# Patient Record
Sex: FEMALE | Race: WHITE | NOT HISPANIC OR LATINO | Employment: OTHER | URBAN - METROPOLITAN AREA
[De-identification: names, ages, dates, MRNs, and addresses within clinical notes are randomized per-mention and may not be internally consistent; named-entity substitution may affect disease eponyms.]

---

## 2021-06-05 ENCOUNTER — APPOINTMENT (OUTPATIENT)
Dept: GENERAL RADIOLOGY | Facility: HOSPITAL | Age: 66
End: 2021-06-05

## 2021-06-05 ENCOUNTER — HOSPITAL ENCOUNTER (EMERGENCY)
Facility: HOSPITAL | Age: 66
Discharge: HOME | End: 2021-06-05
Attending: EMERGENCY MEDICINE | Admitting: EMERGENCY MEDICINE

## 2021-06-05 VITALS
WEIGHT: 293 LBS | HEIGHT: 63 IN | OXYGEN SATURATION: 100 % | RESPIRATION RATE: 18 BRPM | DIASTOLIC BLOOD PRESSURE: 76 MMHG | HEART RATE: 90 BPM | BODY MASS INDEX: 51.91 KG/M2 | SYSTOLIC BLOOD PRESSURE: 173 MMHG | TEMPERATURE: 99 F

## 2021-06-05 DIAGNOSIS — I50.9 ACUTE ON CHRONIC CONGESTIVE HEART FAILURE, UNSPECIFIED HEART FAILURE TYPE (HCC): Primary | ICD-10-CM

## 2021-06-05 DIAGNOSIS — R53.81 PHYSICAL DECONDITIONING: ICD-10-CM

## 2021-06-05 LAB
ALBUMIN SERPL-MCNC: 3.8 G/DL (ref 3.5–5.2)
ALBUMIN/GLOB SERPL: 1.6 G/DL
ALP SERPL-CCNC: 73 U/L (ref 39–117)
ALT SERPL W P-5'-P-CCNC: 17 U/L (ref 1–33)
ANION GAP SERPL CALCULATED.3IONS-SCNC: 6.7 MMOL/L (ref 5–15)
AST SERPL-CCNC: 21 U/L (ref 1–32)
BASOPHILS # BLD AUTO: 0.03 10*3/MM3 (ref 0–0.2)
BASOPHILS NFR BLD AUTO: 0.4 % (ref 0–1.5)
BILIRUB SERPL-MCNC: 0.8 MG/DL (ref 0–1.2)
BUN SERPL-MCNC: 11 MG/DL (ref 8–23)
BUN/CREAT SERPL: 17.7 (ref 7–25)
CALCIUM SPEC-SCNC: 8.5 MG/DL (ref 8.6–10.5)
CHLORIDE SERPL-SCNC: 107 MMOL/L (ref 98–107)
CO2 SERPL-SCNC: 29.3 MMOL/L (ref 22–29)
CREAT SERPL-MCNC: 0.62 MG/DL (ref 0.57–1)
DEPRECATED RDW RBC AUTO: 59.6 FL (ref 37–54)
EOSINOPHIL # BLD AUTO: 0.27 10*3/MM3 (ref 0–0.4)
EOSINOPHIL NFR BLD AUTO: 4 % (ref 0.3–6.2)
ERYTHROCYTE [DISTWIDTH] IN BLOOD BY AUTOMATED COUNT: 18.2 % (ref 12.3–15.4)
GFR SERPL CREATININE-BSD FRML MDRD: 96 ML/MIN/1.73
GLOBULIN UR ELPH-MCNC: 2.4 GM/DL
GLUCOSE SERPL-MCNC: 137 MG/DL (ref 65–99)
HCT VFR BLD AUTO: 31.5 % (ref 34–46.6)
HGB BLD-MCNC: 10 G/DL (ref 12–15.9)
HOLD SPECIMEN: NORMAL
HOLD SPECIMEN: NORMAL
IMM GRANULOCYTES # BLD AUTO: 0.04 10*3/MM3 (ref 0–0.05)
IMM GRANULOCYTES NFR BLD AUTO: 0.6 % (ref 0–0.5)
LYMPHOCYTES # BLD AUTO: 1.13 10*3/MM3 (ref 0.7–3.1)
LYMPHOCYTES NFR BLD AUTO: 16.6 % (ref 19.6–45.3)
MCH RBC QN AUTO: 29.2 PG (ref 26.6–33)
MCHC RBC AUTO-ENTMCNC: 31.7 G/DL (ref 31.5–35.7)
MCV RBC AUTO: 91.8 FL (ref 79–97)
MONOCYTES # BLD AUTO: 0.5 10*3/MM3 (ref 0.1–0.9)
MONOCYTES NFR BLD AUTO: 7.4 % (ref 5–12)
NEUTROPHILS NFR BLD AUTO: 4.82 10*3/MM3 (ref 1.7–7)
NEUTROPHILS NFR BLD AUTO: 71 % (ref 42.7–76)
NRBC BLD AUTO-RTO: 0 /100 WBC (ref 0–0.2)
NT-PROBNP SERPL-MCNC: 593.2 PG/ML (ref 0–900)
PLATELET # BLD AUTO: 203 10*3/MM3 (ref 140–450)
PMV BLD AUTO: 10.2 FL (ref 6–12)
POTASSIUM SERPL-SCNC: 3.7 MMOL/L (ref 3.5–5.2)
PROT SERPL-MCNC: 6.2 G/DL (ref 6–8.5)
RBC # BLD AUTO: 3.43 10*6/MM3 (ref 3.77–5.28)
SODIUM SERPL-SCNC: 143 MMOL/L (ref 136–145)
TROPONIN T SERPL-MCNC: <0.01 NG/ML (ref 0–0.03)
WBC # BLD AUTO: 6.79 10*3/MM3 (ref 3.4–10.8)
WHOLE BLOOD HOLD SPECIMEN: NORMAL
WHOLE BLOOD HOLD SPECIMEN: NORMAL

## 2021-06-05 PROCEDURE — 84484 ASSAY OF TROPONIN QUANT: CPT | Performed by: EMERGENCY MEDICINE

## 2021-06-05 PROCEDURE — 93010 ELECTROCARDIOGRAM REPORT: CPT | Performed by: INTERNAL MEDICINE

## 2021-06-05 PROCEDURE — 25010000002 FUROSEMIDE PER 20 MG: Performed by: EMERGENCY MEDICINE

## 2021-06-05 PROCEDURE — 80053 COMPREHEN METABOLIC PANEL: CPT | Performed by: EMERGENCY MEDICINE

## 2021-06-05 PROCEDURE — 99283 EMERGENCY DEPT VISIT LOW MDM: CPT

## 2021-06-05 PROCEDURE — 93005 ELECTROCARDIOGRAM TRACING: CPT | Performed by: EMERGENCY MEDICINE

## 2021-06-05 PROCEDURE — 85025 COMPLETE CBC W/AUTO DIFF WBC: CPT | Performed by: EMERGENCY MEDICINE

## 2021-06-05 PROCEDURE — 96374 THER/PROPH/DIAG INJ IV PUSH: CPT

## 2021-06-05 PROCEDURE — 71046 X-RAY EXAM CHEST 2 VIEWS: CPT

## 2021-06-05 PROCEDURE — 83880 ASSAY OF NATRIURETIC PEPTIDE: CPT | Performed by: EMERGENCY MEDICINE

## 2021-06-05 RX ORDER — SODIUM CHLORIDE 0.9 % (FLUSH) 0.9 %
10 SYRINGE (ML) INJECTION AS NEEDED
Status: DISCONTINUED | OUTPATIENT
Start: 2021-06-05 | End: 2021-06-05 | Stop reason: HOSPADM

## 2021-06-05 RX ORDER — FUROSEMIDE 10 MG/ML
40 INJECTION INTRAMUSCULAR; INTRAVENOUS ONCE
Status: COMPLETED | OUTPATIENT
Start: 2021-06-05 | End: 2021-06-05

## 2021-06-05 RX ADMIN — FUROSEMIDE 40 MG: 10 INJECTION, SOLUTION INTRAMUSCULAR; INTRAVENOUS at 17:54

## 2021-06-05 NOTE — ED TRIAGE NOTES
Pt to ER via PV. Pt states she was recently admitted to [facility] for CHF. Pt states she became more SOA and increased swelling since yesterday.     Patient in mask. This RN in appropriate PPE - including mask, goggles, and gloves during all of patient care.

## 2021-06-05 NOTE — CASE MANAGEMENT/SOCIAL WORK
Pt states that she cannot make her appointments due to transportation. Informed pt to contact her insurance, [employer], as they have transportation services available and can assist with her transportation needs. Phone number given to pt and her sister. Pt then stated she needed a pulse oximeter. Pulse oximeter obtained for pt. Pt stated that she needed a blood pressure cuff. Blood pressure cuff obtained for pt. Pt refused for home health or for rehab services. Informed ER provider. Pt to be discharged. No other questions or concerns voiced at this time. Rachna Gutierrez RN

## 2021-06-05 NOTE — ED PROVIDER NOTES
EMERGENCY DEPARTMENT ENCOUNTER    Room Number:  22/22  Date of encounter:  6/5/2021  PCP: Hardeep Bowman MD  Patient Care Team:  Hardeep Bowman MD as PCP - General (Family Medicine)   Historian: Patient    HPI:  Chief Complaint: Shortness of breath and swelling  A complete HPI/ROS/PMH/PSH/SH/FH are unobtainable due to: Nothing    Context: Jennifer Cespedes is a 65 y.o. female who presents to the ED c/o swelling to her bilateral lower extremities as well as increasing shortness of breath.  She reports she was discharged from [facility] yesterday after staying 12 days in the hospital and having 50 pounds of fluid removed from her.  She reports that since discharge her swelling has increased and her shortness of breath has increased.  She reports that she was placed on oxygen during that admission and it was continued at home.  She reports that she was told to return to the emergency room if her symptoms worsened.  She states she was unhappy with the care at [place] and so she came here.  She denies chest pain.    Prior record review: Admit date: 5/24/2021    Discharge date and time: 6/2/2021 2:45 PM    Admitting Physician: MATTHEW Barajas MD     Discharge Physician: Chidi Snider    Admission Diagnoses: Acute on chronic diastolic CHF (congestive heart failure) (CMS/[facility])    Hospital Problems:   Principal Problem:  Acute on chronic diastolic CHF (congestive heart failure) (CMS/[facility]) (5/24/2021) POA: Yes  Active Problems:  Hypertension () POA: Yes  Type 2 diabetes, HbA1c goal < 7% (CMS/[facility]) () POA: Yes  GERD (gastroesophageal reflux disease) () POA: Yes  Dyslipidemia () POA: Yes  Morbid obesity (CMS/[facility]) (9/24/2012) POA: Yes  Status following gastric banding surgery for weight loss (10/17/2012) POA: Not Applicable  Tinea cruris (8/3/2015) POA: Yes  Acute respiratory failure with hypoxia (CMS/[facility]) (5/25/2021) POA: Yes    Discharge Diagnoses: Acute on chronic diastolic heart failure, acute hypoxic respiratory  failure, chronic lymphedema, morbid obesity    Discharged Condition: stable    Hospital Course: 65-year-old female with a history of diastolic CHF, hypertension, hyperlipidemia and morbid obesity presented with lower extremity edema found to be in acute respiratory failure.    Exam/labs consistent with volume overload from CHF exacerbation. Underwent aggressive diuresis back to relative euvolemia given chronic lymphedema.    Oxygenation improved but still qualified for home O2 due to likely component of obesity hypoventilation syndrome. Has Trilogy ventilator at home.    BP relatively soft throughout admission. Held HCTZ/ACEi; transitioned to po Lasix 40mg only. PCP to follow up volume status and BP.     Started on miconazole cream for tinea cruris of pannus folds.    Morbid obesity resulting in debility qualifying for ADÁN, but patient declining. Discharged to family with home health. 30 minutes spent in discharge.      PAST MEDICAL HISTORY  Active Ambulatory Problems     Diagnosis Date Noted   • No Active Ambulatory Problems     Resolved Ambulatory Problems     Diagnosis Date Noted   • No Resolved Ambulatory Problems     No Additional Past Medical History         PAST SURGICAL HISTORY  No past surgical history on file.      FAMILY HISTORY  No family history on file.      SOCIAL HISTORY  Social History     Socioeconomic History   • Marital status:      Spouse name: Not on file   • Number of children: Not on file   • Years of education: Not on file   • Highest education level: Not on file         ALLERGIES  Latex        REVIEW OF SYSTEMS  Review of Systems   No chest pain, no palpitations, positive shortness of breath, positive extremity swelling, negative abdominal pain, negative nausea, negative vomiting  All systems reviewed and negative except for those discussed in HPI.       PHYSICAL EXAM    I have reviewed the triage vital signs and nursing notes.    ED Triage Vitals   Temp Heart Rate Resp BP SpO2    06/05/21 1427 06/05/21 1427 06/05/21 1427 06/05/21 1428 06/05/21 1427   99 °F (37.2 °C) 101 18 163/70 92 %      Temp src Heart Rate Source Patient Position BP Location FiO2 (%)   -- -- -- -- --              Physical Exam  GENERAL: Awake, alert, no acute distress  SKIN: Warm, dry  HENT: Normocephalic, atraumatic  EYES: no scleral icterus  CV: regular rhythm, regular rate  RESPIRATORY: normal effort, lungs diminished  ABDOMEN: soft, nontender, nondistended  MUSCULOSKELETAL: no deformity, 4+ edema bilateral lower extremities which appears chronic.  NEURO: alert, moves all extremities, follows commands          LAB RESULTS  Recent Results (from the past 24 hour(s))   ECG 12 Lead    Collection Time: 06/05/21  4:04 PM   Result Value Ref Range    QT Interval 371 ms   Comprehensive Metabolic Panel    Collection Time: 06/05/21  4:17 PM    Specimen: Arm, Right; Blood   Result Value Ref Range    Glucose 137 (H) 65 - 99 mg/dL    BUN 11 8 - 23 mg/dL    Creatinine 0.62 0.57 - 1.00 mg/dL    Sodium 143 136 - 145 mmol/L    Potassium 3.7 3.5 - 5.2 mmol/L    Chloride 107 98 - 107 mmol/L    CO2 29.3 (H) 22.0 - 29.0 mmol/L    Calcium 8.5 (L) 8.6 - 10.5 mg/dL    Total Protein 6.2 6.0 - 8.5 g/dL    Albumin 3.80 3.50 - 5.20 g/dL    ALT (SGPT) 17 1 - 33 U/L    AST (SGOT) 21 1 - 32 U/L    Alkaline Phosphatase 73 39 - 117 U/L    Total Bilirubin 0.8 0.0 - 1.2 mg/dL    eGFR Non African Amer 96 >60 mL/min/1.73    Globulin 2.4 gm/dL    A/G Ratio 1.6 g/dL    BUN/Creatinine Ratio 17.7 7.0 - 25.0    Anion Gap 6.7 5.0 - 15.0 mmol/L   BNP    Collection Time: 06/05/21  4:17 PM    Specimen: Arm, Right; Blood   Result Value Ref Range    proBNP 593.2 0.0 - 900.0 pg/mL   Troponin    Collection Time: 06/05/21  4:17 PM    Specimen: Arm, Right; Blood   Result Value Ref Range    Troponin T <0.010 0.000 - 0.030 ng/mL   Light Blue Top    Collection Time: 06/05/21  4:17 PM   Result Value Ref Range    Extra Tube hold for add-on    Green Top (Gel)    Collection  Time: 06/05/21  4:17 PM   Result Value Ref Range    Extra Tube Hold for add-ons.    Lavender Top    Collection Time: 06/05/21  4:17 PM   Result Value Ref Range    Extra Tube hold for add-on    Gold Top - SST    Collection Time: 06/05/21  4:17 PM   Result Value Ref Range    Extra Tube Hold for add-ons.    CBC Auto Differential    Collection Time: 06/05/21  4:17 PM    Specimen: Arm, Right; Blood   Result Value Ref Range    WBC 6.79 3.40 - 10.80 10*3/mm3    RBC 3.43 (L) 3.77 - 5.28 10*6/mm3    Hemoglobin 10.0 (L) 12.0 - 15.9 g/dL    Hematocrit 31.5 (L) 34.0 - 46.6 %    MCV 91.8 79.0 - 97.0 fL    MCH 29.2 26.6 - 33.0 pg    MCHC 31.7 31.5 - 35.7 g/dL    RDW 18.2 (H) 12.3 - 15.4 %    RDW-SD 59.6 (H) 37.0 - 54.0 fl    MPV 10.2 6.0 - 12.0 fL    Platelets 203 140 - 450 10*3/mm3    Neutrophil % 71.0 42.7 - 76.0 %    Lymphocyte % 16.6 (L) 19.6 - 45.3 %    Monocyte % 7.4 5.0 - 12.0 %    Eosinophil % 4.0 0.3 - 6.2 %    Basophil % 0.4 0.0 - 1.5 %    Immature Grans % 0.6 (H) 0.0 - 0.5 %    Neutrophils, Absolute 4.82 1.70 - 7.00 10*3/mm3    Lymphocytes, Absolute 1.13 0.70 - 3.10 10*3/mm3    Monocytes, Absolute 0.50 0.10 - 0.90 10*3/mm3    Eosinophils, Absolute 0.27 0.00 - 0.40 10*3/mm3    Basophils, Absolute 0.03 0.00 - 0.20 10*3/mm3    Immature Grans, Absolute 0.04 0.00 - 0.05 10*3/mm3    nRBC 0.0 0.0 - 0.2 /100 WBC       Ordered the above labs and independently reviewed the results.        RADIOLOGY  XR Chest 2 View    Result Date: 6/5/2021  XR CHEST 2 VW-  HISTORY: Female who is 65 years-old,  short of breath  TECHNIQUE: Frontal and lateral views of the chest  COMPARISON: 07/04/2008  FINDINGS: Body habitus diminishes signal-to-noise ratio. The heart size is borderline. Mild prominence of vascular and interstitial markings. No focal pulmonary consolidation, pleural effusion, or pneumothorax. No acute osseous process.      No focal pulmonary consolidation. Borderline heart size with mild prominence of vascular and interstitial  markings. Follow-up as clinical indications persist.  This report was finalized on 6/5/2021 4:04 PM by Dr. Roberto Wheeler M.D.        I ordered the above noted radiological studies. Reviewed by me and discussed with radiologist.  See dictation for official radiology interpretation.      PROCEDURES    Procedures      MEDICATIONS GIVEN IN ER    Medications   sodium chloride 0.9 % flush 10 mL (has no administration in time range)   furosemide (LASIX) injection 40 mg (has no administration in time range)         PROGRESS, DATA ANALYSIS, CONSULTS, AND MEDICAL DECISION MAKING    All labs have been independently reviewed by me.  All radiology studies have been reviewed by me and discussed with radiologist dictating the report.   EKG's independently viewed and interpreted by me.  Discussion below represents my analysis of pertinent findings related to patient's condition, differential diagnosis, treatment plan and final disposition.        ED Course as of Jun 05 1740   Sat Jun 05, 2021   1548 She is not currently in the room.    [TR]   1629 EKG          EKG time: 1604  Rhythm/Rate: Normal sinus, rate 94  P waves and KS: Normal P, normal KS  QRS, axis: Narrow QRS, normal axis  ST and T waves: Normal ST and T waves accounting for artifact    Interpreted Contemporaneously by me, independently viewed  No prior EKG available for comparison      [TR]   1728 Hemoglobin(!): 10.0 [TR]   1737 Reviewed work-up and findings with the patient and family.  The patient still refuses to go to subacute rehab.  She is essentially euvolemic today with a touch volume overload.  She has chronic lymphedema which appears unchanged.  She reports she was supposed to follow-up with her primary care doctor yesterday but could not be mobile enough to get into the car to go there.  She again continues to refuse subacute rehab.  I have no indication to admit her today.  She wants information on wheelchair transport vans so that she can travel to her  doctor's appointment.  Plan to give her a dose of Lasix here and have CCP give her information on the transport van.    [TR]      ED Course User Index  [TR] Rick Lujan MD           PPE: Both the patient and I wore a surgical mask throughout the entire patient encounter. I wore protective goggles.       AS OF 17:40 EDT VITALS:    BP - 177/78  HR - 87  TEMP - 99 °F (37.2 °C)  O2 SATS - 100%        DIAGNOSIS  Final diagnoses:   Acute on chronic congestive heart failure, unspecified heart failure type (CMS/HCC)   Physical deconditioning         DISPOSITION  ED Disposition     ED Disposition Condition Comment    Discharge Stable                 Rick Lujan MD  06/05/21 [id number]

## 2021-06-06 LAB — QT INTERVAL: 371 MS
